# Patient Record
Sex: MALE | Race: OTHER | NOT HISPANIC OR LATINO | URBAN - METROPOLITAN AREA
[De-identification: names, ages, dates, MRNs, and addresses within clinical notes are randomized per-mention and may not be internally consistent; named-entity substitution may affect disease eponyms.]

---

## 2023-12-19 ENCOUNTER — EMERGENCY (EMERGENCY)
Facility: HOSPITAL | Age: 23
LOS: 1 days | Discharge: HOME | End: 2023-12-19
Attending: EMERGENCY MEDICINE
Payer: MEDICAID

## 2023-12-19 VITALS
HEART RATE: 73 BPM | TEMPERATURE: 98 F | RESPIRATION RATE: 18 BRPM | OXYGEN SATURATION: 96 % | DIASTOLIC BLOOD PRESSURE: 91 MMHG | WEIGHT: 162.26 LBS | SYSTOLIC BLOOD PRESSURE: 150 MMHG

## 2023-12-19 VITALS
RESPIRATION RATE: 18 BRPM | SYSTOLIC BLOOD PRESSURE: 139 MMHG | DIASTOLIC BLOOD PRESSURE: 87 MMHG | HEART RATE: 53 BPM | OXYGEN SATURATION: 96 %

## 2023-12-19 LAB
ALBUMIN SERPL ELPH-MCNC: 4.4 G/DL — SIGNIFICANT CHANGE UP (ref 3.3–5.2)
ALBUMIN SERPL ELPH-MCNC: 4.4 G/DL — SIGNIFICANT CHANGE UP (ref 3.3–5.2)
ALP SERPL-CCNC: 59 U/L — SIGNIFICANT CHANGE UP (ref 40–120)
ALP SERPL-CCNC: 59 U/L — SIGNIFICANT CHANGE UP (ref 40–120)
ALT FLD-CCNC: 27 U/L — SIGNIFICANT CHANGE UP
ALT FLD-CCNC: 27 U/L — SIGNIFICANT CHANGE UP
ANION GAP SERPL CALC-SCNC: 11 MMOL/L — SIGNIFICANT CHANGE UP (ref 5–17)
ANION GAP SERPL CALC-SCNC: 11 MMOL/L — SIGNIFICANT CHANGE UP (ref 5–17)
ANION GAP SERPL CALC-SCNC: 9 MMOL/L — SIGNIFICANT CHANGE UP (ref 5–17)
ANION GAP SERPL CALC-SCNC: 9 MMOL/L — SIGNIFICANT CHANGE UP (ref 5–17)
APPEARANCE UR: CLEAR — SIGNIFICANT CHANGE UP
APPEARANCE UR: CLEAR — SIGNIFICANT CHANGE UP
AST SERPL-CCNC: 29 U/L — SIGNIFICANT CHANGE UP
AST SERPL-CCNC: 29 U/L — SIGNIFICANT CHANGE UP
BASOPHILS # BLD AUTO: 0.05 K/UL — SIGNIFICANT CHANGE UP (ref 0–0.2)
BASOPHILS # BLD AUTO: 0.05 K/UL — SIGNIFICANT CHANGE UP (ref 0–0.2)
BASOPHILS NFR BLD AUTO: 0.4 % — SIGNIFICANT CHANGE UP (ref 0–2)
BASOPHILS NFR BLD AUTO: 0.4 % — SIGNIFICANT CHANGE UP (ref 0–2)
BILIRUB SERPL-MCNC: 0.2 MG/DL — LOW (ref 0.4–2)
BILIRUB SERPL-MCNC: 0.2 MG/DL — LOW (ref 0.4–2)
BILIRUB UR-MCNC: NEGATIVE — SIGNIFICANT CHANGE UP
BILIRUB UR-MCNC: NEGATIVE — SIGNIFICANT CHANGE UP
BUN SERPL-MCNC: 17.7 MG/DL — SIGNIFICANT CHANGE UP (ref 8–20)
BUN SERPL-MCNC: 17.7 MG/DL — SIGNIFICANT CHANGE UP (ref 8–20)
BUN SERPL-MCNC: 20.2 MG/DL — HIGH (ref 8–20)
BUN SERPL-MCNC: 20.2 MG/DL — HIGH (ref 8–20)
CALCIUM SERPL-MCNC: 10 MG/DL — SIGNIFICANT CHANGE UP (ref 8.4–10.5)
CALCIUM SERPL-MCNC: 10 MG/DL — SIGNIFICANT CHANGE UP (ref 8.4–10.5)
CALCIUM SERPL-MCNC: 8.6 MG/DL — SIGNIFICANT CHANGE UP (ref 8.4–10.5)
CALCIUM SERPL-MCNC: 8.6 MG/DL — SIGNIFICANT CHANGE UP (ref 8.4–10.5)
CHLORIDE SERPL-SCNC: 100 MMOL/L — SIGNIFICANT CHANGE UP (ref 96–108)
CHLORIDE SERPL-SCNC: 100 MMOL/L — SIGNIFICANT CHANGE UP (ref 96–108)
CHLORIDE SERPL-SCNC: 108 MMOL/L — SIGNIFICANT CHANGE UP (ref 96–108)
CHLORIDE SERPL-SCNC: 108 MMOL/L — SIGNIFICANT CHANGE UP (ref 96–108)
CO2 SERPL-SCNC: 25 MMOL/L — SIGNIFICANT CHANGE UP (ref 22–29)
CO2 SERPL-SCNC: 25 MMOL/L — SIGNIFICANT CHANGE UP (ref 22–29)
CO2 SERPL-SCNC: 27 MMOL/L — SIGNIFICANT CHANGE UP (ref 22–29)
CO2 SERPL-SCNC: 27 MMOL/L — SIGNIFICANT CHANGE UP (ref 22–29)
COLOR SPEC: YELLOW — SIGNIFICANT CHANGE UP
COLOR SPEC: YELLOW — SIGNIFICANT CHANGE UP
CREAT SERPL-MCNC: 1.43 MG/DL — HIGH (ref 0.5–1.3)
CREAT SERPL-MCNC: 1.43 MG/DL — HIGH (ref 0.5–1.3)
CREAT SERPL-MCNC: 1.85 MG/DL — HIGH (ref 0.5–1.3)
CREAT SERPL-MCNC: 1.85 MG/DL — HIGH (ref 0.5–1.3)
DIFF PNL FLD: NEGATIVE — SIGNIFICANT CHANGE UP
DIFF PNL FLD: NEGATIVE — SIGNIFICANT CHANGE UP
EGFR: 51 ML/MIN/1.73M2 — LOW
EGFR: 51 ML/MIN/1.73M2 — LOW
EGFR: 70 ML/MIN/1.73M2 — SIGNIFICANT CHANGE UP
EGFR: 70 ML/MIN/1.73M2 — SIGNIFICANT CHANGE UP
EOSINOPHIL # BLD AUTO: 0.56 K/UL — HIGH (ref 0–0.5)
EOSINOPHIL # BLD AUTO: 0.56 K/UL — HIGH (ref 0–0.5)
EOSINOPHIL NFR BLD AUTO: 4.8 % — SIGNIFICANT CHANGE UP (ref 0–6)
EOSINOPHIL NFR BLD AUTO: 4.8 % — SIGNIFICANT CHANGE UP (ref 0–6)
GLUCOSE SERPL-MCNC: 101 MG/DL — HIGH (ref 70–99)
GLUCOSE SERPL-MCNC: 101 MG/DL — HIGH (ref 70–99)
GLUCOSE SERPL-MCNC: 104 MG/DL — HIGH (ref 70–99)
GLUCOSE SERPL-MCNC: 104 MG/DL — HIGH (ref 70–99)
GLUCOSE UR QL: NEGATIVE MG/DL — SIGNIFICANT CHANGE UP
GLUCOSE UR QL: NEGATIVE MG/DL — SIGNIFICANT CHANGE UP
HCT VFR BLD CALC: 43.8 % — SIGNIFICANT CHANGE UP (ref 39–50)
HCT VFR BLD CALC: 43.8 % — SIGNIFICANT CHANGE UP (ref 39–50)
HGB BLD-MCNC: 14.5 G/DL — SIGNIFICANT CHANGE UP (ref 13–17)
HGB BLD-MCNC: 14.5 G/DL — SIGNIFICANT CHANGE UP (ref 13–17)
IMM GRANULOCYTES NFR BLD AUTO: 0.5 % — SIGNIFICANT CHANGE UP (ref 0–0.9)
IMM GRANULOCYTES NFR BLD AUTO: 0.5 % — SIGNIFICANT CHANGE UP (ref 0–0.9)
KETONES UR-MCNC: NEGATIVE MG/DL — SIGNIFICANT CHANGE UP
KETONES UR-MCNC: NEGATIVE MG/DL — SIGNIFICANT CHANGE UP
LEUKOCYTE ESTERASE UR-ACNC: NEGATIVE — SIGNIFICANT CHANGE UP
LEUKOCYTE ESTERASE UR-ACNC: NEGATIVE — SIGNIFICANT CHANGE UP
LYMPHOCYTES # BLD AUTO: 1.53 K/UL — SIGNIFICANT CHANGE UP (ref 1–3.3)
LYMPHOCYTES # BLD AUTO: 1.53 K/UL — SIGNIFICANT CHANGE UP (ref 1–3.3)
LYMPHOCYTES # BLD AUTO: 13 % — SIGNIFICANT CHANGE UP (ref 13–44)
LYMPHOCYTES # BLD AUTO: 13 % — SIGNIFICANT CHANGE UP (ref 13–44)
MCHC RBC-ENTMCNC: 31.9 PG — SIGNIFICANT CHANGE UP (ref 27–34)
MCHC RBC-ENTMCNC: 31.9 PG — SIGNIFICANT CHANGE UP (ref 27–34)
MCHC RBC-ENTMCNC: 33.1 GM/DL — SIGNIFICANT CHANGE UP (ref 32–36)
MCHC RBC-ENTMCNC: 33.1 GM/DL — SIGNIFICANT CHANGE UP (ref 32–36)
MCV RBC AUTO: 96.5 FL — SIGNIFICANT CHANGE UP (ref 80–100)
MCV RBC AUTO: 96.5 FL — SIGNIFICANT CHANGE UP (ref 80–100)
MONOCYTES # BLD AUTO: 0.96 K/UL — HIGH (ref 0–0.9)
MONOCYTES # BLD AUTO: 0.96 K/UL — HIGH (ref 0–0.9)
MONOCYTES NFR BLD AUTO: 8.2 % — SIGNIFICANT CHANGE UP (ref 2–14)
MONOCYTES NFR BLD AUTO: 8.2 % — SIGNIFICANT CHANGE UP (ref 2–14)
NEUTROPHILS # BLD AUTO: 8.59 K/UL — HIGH (ref 1.8–7.4)
NEUTROPHILS # BLD AUTO: 8.59 K/UL — HIGH (ref 1.8–7.4)
NEUTROPHILS NFR BLD AUTO: 73.1 % — SIGNIFICANT CHANGE UP (ref 43–77)
NEUTROPHILS NFR BLD AUTO: 73.1 % — SIGNIFICANT CHANGE UP (ref 43–77)
NITRITE UR-MCNC: NEGATIVE — SIGNIFICANT CHANGE UP
NITRITE UR-MCNC: NEGATIVE — SIGNIFICANT CHANGE UP
PH UR: 6.5 — SIGNIFICANT CHANGE UP (ref 5–8)
PH UR: 6.5 — SIGNIFICANT CHANGE UP (ref 5–8)
PLATELET # BLD AUTO: 280 K/UL — SIGNIFICANT CHANGE UP (ref 150–400)
PLATELET # BLD AUTO: 280 K/UL — SIGNIFICANT CHANGE UP (ref 150–400)
POTASSIUM SERPL-MCNC: 4.5 MMOL/L — SIGNIFICANT CHANGE UP (ref 3.5–5.3)
POTASSIUM SERPL-MCNC: 4.5 MMOL/L — SIGNIFICANT CHANGE UP (ref 3.5–5.3)
POTASSIUM SERPL-MCNC: 4.7 MMOL/L — SIGNIFICANT CHANGE UP (ref 3.5–5.3)
POTASSIUM SERPL-MCNC: 4.7 MMOL/L — SIGNIFICANT CHANGE UP (ref 3.5–5.3)
POTASSIUM SERPL-SCNC: 4.5 MMOL/L — SIGNIFICANT CHANGE UP (ref 3.5–5.3)
POTASSIUM SERPL-SCNC: 4.5 MMOL/L — SIGNIFICANT CHANGE UP (ref 3.5–5.3)
POTASSIUM SERPL-SCNC: 4.7 MMOL/L — SIGNIFICANT CHANGE UP (ref 3.5–5.3)
POTASSIUM SERPL-SCNC: 4.7 MMOL/L — SIGNIFICANT CHANGE UP (ref 3.5–5.3)
PROT SERPL-MCNC: 6.8 G/DL — SIGNIFICANT CHANGE UP (ref 6.6–8.7)
PROT SERPL-MCNC: 6.8 G/DL — SIGNIFICANT CHANGE UP (ref 6.6–8.7)
PROT UR-MCNC: NEGATIVE MG/DL — SIGNIFICANT CHANGE UP
PROT UR-MCNC: NEGATIVE MG/DL — SIGNIFICANT CHANGE UP
RBC # BLD: 4.54 M/UL — SIGNIFICANT CHANGE UP (ref 4.2–5.8)
RBC # BLD: 4.54 M/UL — SIGNIFICANT CHANGE UP (ref 4.2–5.8)
RBC # FLD: 11.9 % — SIGNIFICANT CHANGE UP (ref 10.3–14.5)
RBC # FLD: 11.9 % — SIGNIFICANT CHANGE UP (ref 10.3–14.5)
SODIUM SERPL-SCNC: 138 MMOL/L — SIGNIFICANT CHANGE UP (ref 135–145)
SODIUM SERPL-SCNC: 138 MMOL/L — SIGNIFICANT CHANGE UP (ref 135–145)
SODIUM SERPL-SCNC: 141 MMOL/L — SIGNIFICANT CHANGE UP (ref 135–145)
SODIUM SERPL-SCNC: 141 MMOL/L — SIGNIFICANT CHANGE UP (ref 135–145)
SP GR SPEC: 1.01 — SIGNIFICANT CHANGE UP (ref 1–1.03)
SP GR SPEC: 1.01 — SIGNIFICANT CHANGE UP (ref 1–1.03)
UROBILINOGEN FLD QL: 1 MG/DL — SIGNIFICANT CHANGE UP (ref 0.2–1)
UROBILINOGEN FLD QL: 1 MG/DL — SIGNIFICANT CHANGE UP (ref 0.2–1)
WBC # BLD: 11.75 K/UL — HIGH (ref 3.8–10.5)
WBC # BLD: 11.75 K/UL — HIGH (ref 3.8–10.5)
WBC # FLD AUTO: 11.75 K/UL — HIGH (ref 3.8–10.5)
WBC # FLD AUTO: 11.75 K/UL — HIGH (ref 3.8–10.5)

## 2023-12-19 PROCEDURE — 80053 COMPREHEN METABOLIC PANEL: CPT

## 2023-12-19 PROCEDURE — 96361 HYDRATE IV INFUSION ADD-ON: CPT

## 2023-12-19 PROCEDURE — 99284 EMERGENCY DEPT VISIT MOD MDM: CPT

## 2023-12-19 PROCEDURE — 74176 CT ABD & PELVIS W/O CONTRAST: CPT | Mod: MD

## 2023-12-19 PROCEDURE — 82550 ASSAY OF CK (CPK): CPT

## 2023-12-19 PROCEDURE — 82553 CREATINE MB FRACTION: CPT

## 2023-12-19 PROCEDURE — 96374 THER/PROPH/DIAG INJ IV PUSH: CPT

## 2023-12-19 PROCEDURE — 99284 EMERGENCY DEPT VISIT MOD MDM: CPT | Mod: 25

## 2023-12-19 PROCEDURE — 74176 CT ABD & PELVIS W/O CONTRAST: CPT | Mod: 26,MD

## 2023-12-19 PROCEDURE — 87086 URINE CULTURE/COLONY COUNT: CPT

## 2023-12-19 PROCEDURE — 81003 URINALYSIS AUTO W/O SCOPE: CPT

## 2023-12-19 PROCEDURE — 96375 TX/PRO/DX INJ NEW DRUG ADDON: CPT

## 2023-12-19 PROCEDURE — 85025 COMPLETE CBC W/AUTO DIFF WBC: CPT

## 2023-12-19 PROCEDURE — 36415 COLL VENOUS BLD VENIPUNCTURE: CPT

## 2023-12-19 PROCEDURE — 80048 BASIC METABOLIC PNL TOTAL CA: CPT

## 2023-12-19 RX ORDER — SODIUM CHLORIDE 9 MG/ML
1000 INJECTION INTRAMUSCULAR; INTRAVENOUS; SUBCUTANEOUS ONCE
Refills: 0 | Status: COMPLETED | OUTPATIENT
Start: 2023-12-19 | End: 2023-12-19

## 2023-12-19 RX ORDER — KETOROLAC TROMETHAMINE 30 MG/ML
15 SYRINGE (ML) INJECTION ONCE
Refills: 0 | Status: DISCONTINUED | OUTPATIENT
Start: 2023-12-19 | End: 2023-12-19

## 2023-12-19 RX ORDER — ONDANSETRON 8 MG/1
4 TABLET, FILM COATED ORAL ONCE
Refills: 0 | Status: COMPLETED | OUTPATIENT
Start: 2023-12-19 | End: 2023-12-19

## 2023-12-19 RX ADMIN — ONDANSETRON 4 MILLIGRAM(S): 8 TABLET, FILM COATED ORAL at 05:42

## 2023-12-19 RX ADMIN — SODIUM CHLORIDE 1000 MILLILITER(S): 9 INJECTION INTRAMUSCULAR; INTRAVENOUS; SUBCUTANEOUS at 05:42

## 2023-12-19 RX ADMIN — Medication 15 MILLIGRAM(S): at 05:42

## 2023-12-19 RX ADMIN — SODIUM CHLORIDE 1000 MILLILITER(S): 9 INJECTION INTRAMUSCULAR; INTRAVENOUS; SUBCUTANEOUS at 07:34

## 2023-12-19 RX ADMIN — SODIUM CHLORIDE 1000 MILLILITER(S): 9 INJECTION INTRAMUSCULAR; INTRAVENOUS; SUBCUTANEOUS at 08:55

## 2023-12-19 NOTE — ED ADULT NURSE NOTE - ED STAT RN HANDOFF DETAILS
Pt verbalizes understanding & denies any questions regarding d/c instructions & folllow-up. Will return to ED for any new or worsening symptoms.

## 2023-12-19 NOTE — ED PROVIDER NOTE - CARE PROVIDER_API CALL
Eliecer Logan  Nephrology  [address] [place] [id number]  Phone: [phone]  Fax: [phone]  Follow Up Time: 4-6 Days   Eliecer Logan  Nephrology  [address] [place] [id number]  Phone: [phone]  Fax: [phone]  Follow Up Time: 4-6 Days

## 2023-12-19 NOTE — ED PROVIDER NOTE - PROVIDER TOKENS
PROVIDER:[TOKEN:[[id number]],FOLLOWUP:[4-6 Days]] PROVIDER:[TOKEN:[[id number]],FOLLOWUP:[4-6 Days]]

## 2023-12-19 NOTE — ED PROVIDER NOTE - PROGRESS NOTE DETAILS
Shari: Patient seen and examined after sign out. Dad at bedside. I shared plan to repeat bmp after fluids, still pending CT scan at this time. I discussed at length the danger of using these supplements which are likely causing his ARF. Patient and father verbalize understanding. Pt received IVF, Cr improved 1.8 -> 1.4. CT abdomen negative for acute pathology. Pt advised to discontinue workout supplements and to follow up with nephrology.

## 2023-12-19 NOTE — ED PROVIDER NOTE - NSFOLLOWUPINSTRUCTIONS_ED_ALL_ED_FT
Acute Kidney Injury, Adult  Body outline of a person, showing the kidneys.  Acute kidney injury is a sudden decrease in the ability of the kidneys to do what they are supposed to do. The kidneys are a pair of organs that:  Make urine.  Make hormones.  Keep the right amount of fluids and chemicals in the body.  This condition ranges from mild to severe. Over time, it may turn into long-term (chronic) kidney disease. Finding and treating the injury early may keep it from turning into chronic kidney disease.    What are the causes?  Common causes of this condition include:  A problem with blood flow to the kidneys. This may be caused by:  Low blood pressure, shock, or severe dehydration.  Severe blood loss.  Heart and blood vessel disease.  Severe burns.  Liver disease.  Direct damage to the kidneys. This may be caused by:  Certain medicines or toxins.  Kidney disease.  Contrast dye used in imaging tests.  An infection of the kidney or bloodstream.  Problems from surgery.  Trauma to the kidney area.  Organ failure. This includes heart or liver failure.  A sudden block in urine flow. This may be caused by:  Cancer.  Kidney stones.  An enlarged prostate.  What increases the risk?  You may be more likely to develop this condition if:  You are older than 65 years of age.  You are female.  You are in the hospital. You may be even more at risk if you are very sick.  You have certain conditions. These may include:  Chronic kidney or liver disease.  Diabetes.  Heart disease and heart failure.  Lung disease.  What are the signs or symptoms?  This condition may not cause symptoms until it becomes severe. If it does, symptoms may include:  Feeling very tired or having trouble staying awake.  Nausea or vomiting.  Swelling (edema) of the face, legs, ankles, or feet.  Pain in your abdomen, back, or along the side of your back (flank).  Urine changes. You may:  Make little or no urine.  Pass urine with a weak flow.  Muscle twitches and cramps. These are most often in the legs.  Confusion or trouble focusing.  Not feeling the urge to eat.  Fever.  How is this diagnosed?  This condition may be diagnosed based on your symptoms and your medical history. You may have a physical exam done.    You may also have tests, such as:  Blood tests.  Urine tests.  Imaging tests.  A kidney biopsy. This is when a sample of kidney tissue is removed and looked at under a microscope.  How is this treated?  Treatment depends on the cause and how severe the condition is. In mild cases, treatment may not be needed. The kidneys may heal on their own.    In severe cases, treatment may include:  Treating the cause of the kidney injury. This may mean that you have to change your medicines or the doses you take.  Getting fluids through an IV tube.  Having a flexible tube (catheter) put in. This tube will drain urine and prevent blockages.  Trying to keep problems from starting. This may mean not using certain medicines or not having tests done that could cause more injury.  In some cases, you may also need:  Dialysis or continuous renal replacement therapy (CRRT). This treatment uses a machine to do the job of the kidneys.  Surgery. This may be done to repair a damaged kidney. It could also be done to remove a blockage in the urinary tract.  Follow these instructions at home:  Medicines    Take over-the-counter and prescription medicines only as told by your health care provider.  Do not take new medicines unless approved by your health care provider. Many medicines can make kidney damage worse.  Do not take vitamin or mineral supplements unless approved by your health care provider. Some of these can make kidney damage worse.  Lifestyle    A person riding a bicycle.  Make changes to your diet as told by your health care provider. You may need to eat less protein.  Get to, and stay at, a healthy weight. If you need help, ask your health care provider.  Start or keep up an exercise plan. Exercise at least 30 minutes a day, 5 days a week.  Do not use any products that contain nicotine or tobacco. These products include cigarettes, chewing tobacco, and vaping devices, such as e-cigarettes. If you need help quitting, ask your health care provider.  General instructions    A person checking their blood pressure.  Keep track of your blood pressure. Tell your health care provider if you notice any changes.  Keep your vaccines up to date. Ask your health care provider which vaccines you need.  Keep all follow-up visits. Your health care provider will need to monitor your kidneys.  Where to find support  American Association of Kidney Patients: aakp.org  American Kidney Fund: akfinc.org  Where to find more information  National Kidney Foundation: kidney.org  Medical Education [place]:  LifeOptions: lifeoptions.org  Kidney School: kidneyschool.org  Contact a health care provider if:  Your symptoms get worse.  You have new symptoms, such as:  Headaches.  Skin that is darker or lighter than normal.  Easy bruising.  Feeling itchy.  Hiccups.  Lack of menstrual periods.  You have a fever.  Get help right away if:  You have signs of severe kidney disease, such as:  Chest pain.  Shortness of breath.  Seizures.  You have pain or bleeding when you pass urine.  You make little or no urine.  These symptoms may be an emergency. Get help right away. Call 911.  Do not wait to see if the symptoms will go away.  Do not drive yourself to the hospital.  This information is not intended to replace advice given to you by your health care provider. Make sure you discuss any questions you have with your health care provider. Acute Kidney Injury, Adult  Body outline of a person, showing the kidneys.  Acute kidney injury is a sudden decrease in the ability of the kidneys to do what they are supposed to do. The kidneys are a pair of organs that:  Make urine.  Make hormones.  Keep the right amount of fluids and chemicals in the body.  This condition ranges from mild to severe. Over time, it may turn into long-term (chronic) kidney disease. Finding and treating the injury early may keep it from turning into chronic kidney disease.    What are the causes?  Common causes of this condition include:  A problem with blood flow to the kidneys. This may be caused by:  Low blood pressure, shock, or severe dehydration.  Severe blood loss.  Heart and blood vessel disease.  Severe burns.  Liver disease.  Direct damage to the kidneys. This may be caused by:  Certain medicines or toxins.  Kidney disease.  Contrast dye used in imaging tests.  An infection of the kidney or bloodstream.  Problems from surgery.  Trauma to the kidney area.  Organ failure. This includes heart or liver failure.  A sudden block in urine flow. This may be caused by:  Cancer.  Kidney stones.  An enlarged prostate.  What increases the risk?  You may be more likely to develop this condition if:  You are older than 65 years of age.  You are female.  You are in the hospital. You may be even more at risk if you are very sick.  You have certain conditions. These may include:  Chronic kidney or liver disease.  Diabetes.  Heart disease and heart failure.  Lung disease.  What are the signs or symptoms?  This condition may not cause symptoms until it becomes severe. If it does, symptoms may include:  Feeling very tired or having trouble staying awake.  Nausea or vomiting.  Swelling (edema) of the face, legs, ankles, or feet.  Pain in your abdomen, back, or along the side of your back (flank).  Urine changes. You may:  Make little or no urine.  Pass urine with a weak flow.  Muscle twitches and cramps. These are most often in the legs.  Confusion or trouble focusing.  Not feeling the urge to eat.  Fever.  How is this diagnosed?  This condition may be diagnosed based on your symptoms and your medical history. You may have a physical exam done.    You may also have tests, such as:  Blood tests.  Urine tests.  Imaging tests.  A kidney biopsy. This is when a sample of kidney tissue is removed and looked at under a microscope.  How is this treated?  Treatment depends on the cause and how severe the condition is. In mild cases, treatment may not be needed. The kidneys may heal on their own.    In severe cases, treatment may include:  Treating the cause of the kidney injury. This may mean that you have to change your medicines or the doses you take.  Getting fluids through an IV tube.  Having a flexible tube (catheter) put in. This tube will drain urine and prevent blockages.  Trying to keep problems from starting. This may mean not using certain medicines or not having tests done that could cause more injury.  In some cases, you may also need:  Dialysis or continuous renal replacement therapy (CRRT). This treatment uses a machine to do the job of the kidneys.  Surgery. This may be done to repair a damaged kidney. It could also be done to remove a blockage in the urinary tract.  Follow these instructions at home:  Medicines    Take over-the-counter and prescription medicines only as told by your health care provider.  Do not take new medicines unless approved by your health care provider. Many medicines can make kidney damage worse.  Do not take vitamin or mineral supplements unless approved by your health care provider. Some of these can make kidney damage worse.  Lifestyle    A person riding a bicycle.  Make changes to your diet as told by your health care provider. You may need to eat less protein.  Get to, and stay at, a healthy weight. If you need help, ask your health care provider.  Start or keep up an exercise plan. Exercise at least 30 minutes a day, 5 days a week.  Do not use any products that contain nicotine or tobacco. These products include cigarettes, chewing tobacco, and vaping devices, such as e-cigarettes. If you need help quitting, ask your health care provider.  General instructions    A person checking their blood pressure.  Keep track of your blood pressure. Tell your health care provider if you notice any changes.  Keep your vaccines up to date. Ask your health care provider which vaccines you need.  Keep all follow-up visits. Your health care provider will need to monitor your kidneys.  Where to find support  American Association of Kidney Patients: aakp.org  American Kidney Fund: akfinc.org  Where to find more information  National Kidney Foundation: kidney.org  Medical Education [place]:  LifeOptions: lifeoptions.org  Kidney School: kidneyschool.org  Contact a health care provider if:  Your symptoms get worse.  You have new symptoms, such as:  Headaches.  Skin that is darker or lighter than normal.  Easy bruising.  Feeling itchy.  Hiccups.  Lack of menstrual periods.  You have a fever.  Get help right away if:  You have signs of severe kidney disease, such as:  Chest pain.  Shortness of breath.  Seizures.  You have pain or bleeding when you pass urine.  You make little or no urine.  These symptoms may be an emergency. Get help right away. Call 911.  Do not wait to see if the symptoms will go away.  Do not drive yourself to the hospital.  This information is not intended to replace advice given to you by your health care provider. Make sure you discuss any questions you have with your health care provider.

## 2023-12-19 NOTE — ED PROVIDER NOTE - ATTENDING CONTRIBUTION TO CARE
Mario: I performed a face to face bedside interview with patient regarding history of present illness, review of symptoms and past medical history. I completed an independent physical exam.  I have discussed patient's plan of care with resident.   I agree with note as stated above including HISTORY OF PRESENT ILLNESS, HIV, PAST MEDICAL/SURGICAL/FAMILY/SOCIAL HISTORY, ALLERGIES AND HOME MEDICATIONS, REVIEW OF SYSTEMS, PHYSICAL EXAM, MEDICAL DECISION MAKING and any PROGRESS NOTES during the time I functioned as the attending physician for this patient unless otherwise noted. My brief assessment is as follows: 23-year-old male with intermittent right-sided lower abdominal pain radiating to the back associated with nausea and vomiting.  No fever, chest pain, shortness of breath.  Afebrile, hemodynamically stable, abdomen soft with mild right lower quadrant tenderness.  Differential includes renal colic, appendicitis, and anemia, electrolyte derangement.  Plan for CT renal stone hunt, UA, pain control, labs and reassess.

## 2023-12-19 NOTE — ED PROVIDER NOTE - PATIENT PORTAL LINK FT
You can access the FollowMyHealth Patient Portal offered by [facility] by registering at the following website: [email or web address]. By joining [facility]’s FollowMyHealth portal, you will also be able to view your health information using other applications (apps) compatible with our system. You can access the FollowMyHealth Patient Portal offered by [facility] by registering at the following website: [email or web address]. By joining [facility]’s FollowMyHealth portal, you will also be able to view your health information using other applications (apps) compatible with our system.

## 2023-12-19 NOTE — ED PROVIDER NOTE - PHYSICAL EXAMINATION
General: Well appearing in no acute distress, alert and cooperative  Head: Normocephalic, atraumatic  Eyes: PERRLA, no conjunctival injection, no scleral icterus, EOMI  ENMT: Atraumatic external nose and ears  Neck: Soft and supple, full ROM without pain  Cardiac: Regular rate and regular rhythm, no murmurs, peripheral pulses 2+ and symmetric in all extremities, no LE edema.  Resp: Unlabored respiratory effort, lungs CTAB  Abd: Soft, non-distended,  mild right lower quadrant tenderness  MSK: Spine midline and non-tender, right CVA tenderness   Skin: Warm and dry  Neuro: AO x 3, moves all extremities symmetrically, Motor strength and sensation grossly intact

## 2023-12-19 NOTE — ED PROVIDER NOTE - OBJECTIVE STATEMENT
23-year-old male with no medical history presenting with sudden onset intermittent right-sided lower abdominal pain radiating to back with multiple episodes of nausea and vomiting. Denies fevers, chills, headache, chest pain, palpitations, shortness of breath, cough, hematuria, dysuria, dark stools, focal neurologic symptoms. 23-year-old male with no medical history presenting with sudden onset intermittent right-sided lower abdominal pain radiating to back with multiple episodes of nausea and vomiting. Denies fevers, chills, headache, chest pain, palpitations, shortness of breath, cough, hematuria, dysuria, dark stools, focal neurologic symptoms. Patient reports multiple supplements including 575mg Dianabol once a day, 160mg 1-testosterone twice daily, 100mg Equipoise twice a day.

## 2023-12-19 NOTE — ED PROVIDER NOTE - CLINICAL SUMMARY MEDICAL DECISION MAKING FREE TEXT BOX
23-year-old male with intermittent right-sided lower abdominal pain  In the setting of nausea and vomiting.  No fever, chest pain, shortness of breath.  Afebrile, hemodynamically stable, abdomen soft with mild right lower quadrant tenderness.  Differential includes renal colic, appendicitis, and anemia, electrolyte derangement. 23-year-old male with intermittent right-sided lower abdominal pain radiating to the back associated with nausea and vomiting.  No fever, chest pain, shortness of breath.  Afebrile, hemodynamically stable, abdomen soft with mild right lower quadrant tenderness.  Differential includes renal colic, appendicitis, and anemia, electrolyte derangement.  Plan for CT renal stone hunt, UA, pain control, labs and reassess. 23-year-old male with intermittent right-sided lower abdominal pain radiating to the back associated with nausea and vomiting.  No fever, chest pain, shortness of breath.  Afebrile, hemodynamically stable, abdomen soft with mild right lower quadrant tenderness.  Differential includes renal colic, appendicitis, and anemia, electrolyte derangement.  Plan for CT renal stone hunt, UA, pain control, labs and reassess.    Conversation had with patient regarding results of testing. Patient agrees with plan for discharge at this time. Patient agrees to comply with follow up with nephrology. Return to ED precautions and discharge instructions given to patient.

## 2023-12-19 NOTE — ED ADULT NURSE REASSESSMENT NOTE - NS ED NURSE REASSESS COMMENT FT1
Pt laying in bed, talking w/ father @ bedside. Pt A&Ox4 in NAD @ this time. RR even & unlabored. Pt c/o "mild" abd and lower back pain. Pt denies any needs @ this time. Pt awaiting CT. Safety maintained.

## 2023-12-20 LAB
CULTURE RESULTS: SIGNIFICANT CHANGE UP
CULTURE RESULTS: SIGNIFICANT CHANGE UP
SPECIMEN SOURCE: SIGNIFICANT CHANGE UP
SPECIMEN SOURCE: SIGNIFICANT CHANGE UP